# Patient Record
Sex: MALE | Race: OTHER
[De-identification: names, ages, dates, MRNs, and addresses within clinical notes are randomized per-mention and may not be internally consistent; named-entity substitution may affect disease eponyms.]

---

## 2018-03-23 ENCOUNTER — HOSPITAL ENCOUNTER (INPATIENT)
Dept: HOSPITAL 47 - EC | Age: 43
LOS: 1 days | Discharge: ANOTHER HOSPITAL | DRG: 54 | End: 2018-03-24
Payer: COMMERCIAL

## 2018-03-23 VITALS — BODY MASS INDEX: 29.2 KG/M2

## 2018-03-23 DIAGNOSIS — G93.6: ICD-10-CM

## 2018-03-23 DIAGNOSIS — C71.9: Primary | ICD-10-CM

## 2018-03-23 DIAGNOSIS — E87.2: ICD-10-CM

## 2018-03-23 DIAGNOSIS — G40.909: ICD-10-CM

## 2018-03-23 DIAGNOSIS — E87.0: ICD-10-CM

## 2018-03-23 DIAGNOSIS — Z87.820: ICD-10-CM

## 2018-03-23 DIAGNOSIS — E83.52: ICD-10-CM

## 2018-03-23 DIAGNOSIS — G93.89: ICD-10-CM

## 2018-03-23 DIAGNOSIS — Z79.899: ICD-10-CM

## 2018-03-23 DIAGNOSIS — Z80.6: ICD-10-CM

## 2018-03-23 DIAGNOSIS — I49.3: ICD-10-CM

## 2018-03-23 DIAGNOSIS — Z80.8: ICD-10-CM

## 2018-03-23 DIAGNOSIS — F17.200: ICD-10-CM

## 2018-03-23 DIAGNOSIS — Z91.018: ICD-10-CM

## 2018-03-23 LAB
ALBUMIN SERPL-MCNC: 5.1 G/DL (ref 3.5–5)
ALP SERPL-CCNC: 75 U/L (ref 38–126)
ALT SERPL-CCNC: 53 U/L (ref 21–72)
ANION GAP SERPL CALC-SCNC: 16 MMOL/L
ANION GAP SERPL CALC-SCNC: 37 MMOL/L
APTT BLD: 23.1 SEC (ref 22–30)
AST SERPL-CCNC: 41 U/L (ref 17–59)
BUN SERPL-SCNC: 7 MG/DL (ref 9–20)
BUN SERPL-SCNC: 9 MG/DL (ref 9–20)
CALCIUM SPEC-MCNC: 10.4 MG/DL (ref 8.4–10.2)
CALCIUM SPEC-MCNC: 9.1 MG/DL (ref 8.4–10.2)
CELLS COUNTED: 100
CHLORIDE SERPL-SCNC: 104 MMOL/L (ref 98–107)
CHLORIDE SERPL-SCNC: 110 MMOL/L (ref 98–107)
CK SERPL-CCNC: 73 U/L (ref 55–170)
CO2 BLDA-SCNC: 25 MMOL/L (ref 19–24)
CO2 SERPL-SCNC: 24 MMOL/L (ref 22–30)
CO2 SERPL-SCNC: 9 MMOL/L (ref 22–30)
ERYTHROCYTE [DISTWIDTH] IN BLOOD BY AUTOMATED COUNT: 6.37 M/UL (ref 4.3–5.9)
ERYTHROCYTE [DISTWIDTH] IN BLOOD: 17.4 % (ref 11.5–15.5)
GLUCOSE BLD-MCNC: 112 MG/DL (ref 75–99)
GLUCOSE BLD-MCNC: 126 MG/DL (ref 75–99)
GLUCOSE SERPL-MCNC: 124 MG/DL (ref 74–99)
GLUCOSE SERPL-MCNC: 133 MG/DL (ref 74–99)
HCO3 BLDA-SCNC: 24 MMOL/L (ref 21–25)
HCT VFR BLD AUTO: 50.9 % (ref 39–53)
HGB BLD-MCNC: 15 GM/DL (ref 13–17.5)
INR PPP: 0.9 (ref ?–1.2)
LYMPHOCYTES # BLD MANUAL: 5.92 K/UL (ref 1–4.8)
MAGNESIUM SPEC-SCNC: 2.4 MG/DL (ref 1.6–2.3)
MCH RBC QN AUTO: 23.5 PG (ref 25–35)
MCHC RBC AUTO-ENTMCNC: 29.4 G/DL (ref 31–37)
MCV RBC AUTO: 79.9 FL (ref 80–100)
MONOCYTES # BLD MANUAL: 0.92 K/UL (ref 0–1)
NEUTROPHILS NFR BLD MANUAL: 32 %
NEUTS SEG # BLD MANUAL: 3.3 K/UL (ref 1.3–7.7)
PCO2 BLDA: 52 MMHG (ref 35–45)
PH BLDA: 7.26 [PH] (ref 7.35–7.45)
PH UR: 7 [PH] (ref 5–8)
PLATELET # BLD AUTO: 129 K/UL (ref 150–450)
PO2 BLDA: 107 MMHG (ref 83–108)
POTASSIUM SERPL-SCNC: 4.3 MMOL/L (ref 3.5–5.1)
POTASSIUM SERPL-SCNC: 4.9 MMOL/L (ref 3.5–5.1)
PROT SERPL-MCNC: 8.5 G/DL (ref 6.3–8.2)
PT BLD: 9.5 SEC (ref 9–12)
SODIUM SERPL-SCNC: 144 MMOL/L (ref 137–145)
SODIUM SERPL-SCNC: 156 MMOL/L (ref 137–145)
SP GR UR: 1.01 (ref 1–1.03)
TROPONIN I SERPL-MCNC: <0.012 NG/ML (ref 0–0.03)
UROBILINOGEN UR QL STRIP: <2 MG/DL (ref ?–2)
WBC # BLD AUTO: 10.2 K/UL (ref 3.8–10.6)

## 2018-03-23 PROCEDURE — 36600 WITHDRAWAL OF ARTERIAL BLOOD: CPT

## 2018-03-23 PROCEDURE — 82553 CREATINE MB FRACTION: CPT

## 2018-03-23 PROCEDURE — 36415 COLL VENOUS BLD VENIPUNCTURE: CPT

## 2018-03-23 PROCEDURE — 93005 ELECTROCARDIOGRAM TRACING: CPT

## 2018-03-23 PROCEDURE — 81003 URINALYSIS AUTO W/O SCOPE: CPT

## 2018-03-23 PROCEDURE — 99291 CRITICAL CARE FIRST HOUR: CPT

## 2018-03-23 PROCEDURE — 84100 ASSAY OF PHOSPHORUS: CPT

## 2018-03-23 PROCEDURE — 80048 BASIC METABOLIC PNL TOTAL CA: CPT

## 2018-03-23 PROCEDURE — 85610 PROTHROMBIN TIME: CPT

## 2018-03-23 PROCEDURE — 83735 ASSAY OF MAGNESIUM: CPT

## 2018-03-23 PROCEDURE — 83605 ASSAY OF LACTIC ACID: CPT

## 2018-03-23 PROCEDURE — 85730 THROMBOPLASTIN TIME PARTIAL: CPT

## 2018-03-23 PROCEDURE — 96375 TX/PRO/DX INJ NEW DRUG ADDON: CPT

## 2018-03-23 PROCEDURE — 95816 EEG AWAKE AND DROWSY: CPT

## 2018-03-23 PROCEDURE — 87040 BLOOD CULTURE FOR BACTERIA: CPT

## 2018-03-23 PROCEDURE — 71045 X-RAY EXAM CHEST 1 VIEW: CPT

## 2018-03-23 PROCEDURE — 70450 CT HEAD/BRAIN W/O DYE: CPT

## 2018-03-23 PROCEDURE — 82805 BLOOD GASES W/O2 SATURATION: CPT

## 2018-03-23 PROCEDURE — 80053 COMPREHEN METABOLIC PANEL: CPT

## 2018-03-23 PROCEDURE — 82550 ASSAY OF CK (CPK): CPT

## 2018-03-23 PROCEDURE — 84484 ASSAY OF TROPONIN QUANT: CPT

## 2018-03-23 PROCEDURE — 96374 THER/PROPH/DIAG INJ IV PUSH: CPT

## 2018-03-23 PROCEDURE — 85025 COMPLETE CBC W/AUTO DIFF WBC: CPT

## 2018-03-23 RX ADMIN — POTASSIUM CHLORIDE SCH MLS/HR: 14.9 INJECTION, SOLUTION INTRAVENOUS at 20:12

## 2018-03-23 RX ADMIN — LEVETIRACETAM SCH MLS/HR: 100 INJECTION, SOLUTION, CONCENTRATE INTRAVENOUS at 21:57

## 2018-03-23 RX ADMIN — HEPARIN SODIUM SCH UNIT: 5000 INJECTION, SOLUTION INTRAVENOUS; SUBCUTANEOUS at 23:50

## 2018-03-23 NOTE — XR
EXAMINATION TYPE: XR chest 1V portable

 

DATE OF EXAM: 3/23/2018

 

COMPARISON: NONE

 

HISTORY: Chest pain

 

TECHNIQUE: Single frontal view of the chest is obtained.

 

FINDINGS:  

There is no focal air space opacity, pleural effusion, or pneumothorax seen.  

The cardiac silhouette size is within normal limits.   

The osseous structures are intact remote left rib fracture.

 

IMPRESSION:  

1.  No acute process.

## 2018-03-23 NOTE — HP
HISTORY AND PHYSICAL



DATE OF ADMISSION:

03/23/2018



PRESENTING COMPLAINT:

Seizure.



HISTORY OF PRESENTING COMPLAINT:

This is a 42-year-old patient of Dr. Goodman. History is obtained by the nurse at the

bedside.  Patient is rather lethargic.  Patient was in [place] _____summer when he

collapsed.  He was found to have a glioblastoma.  The patient was at [facility], where the tumor was partially resected, and because there was quite a bit of

the base of the brain, the rest of it could not be removed.  The patient was put on a

chemo pill. The patient was coming out from a doctor's appointment with his stepfather

in the car when he had actually had a seizure. They brought him down to the ER.  He had

2 more seizures there for about 30 seconds.  The patient got Ativan x3 and also was

given IV Keppra. Patient had a sodium of 156 and a bicarb of 9. Hence I asked that the

patient be admitted to the ICU with a consultation to Dr. Whitt and Neurology with IV

fluids and bicarb.  No other family member is currently available.  Patient is known to

be a smoker _____ otherwise lives with his mother.



REVIEW OF SYSTEMS:

Patient is rather lethargic.



PAST MEDICAL HISTORY:

1. Glioblastoma with partial surgical removal.

2. Childhood closed head injury.

3. Chronic headaches.

4. Hemianopsia, left eye.



PAST SURGICAL HISTORY:

As above, including hernia repair.



SOCIAL HISTORY:

Patient does not smoke. Alcohol rarely.



FAMILY HISTORY:

Bone cancer and CLL.



HOME MEDICATIONS:

The patient is on a chemo pill and multivitamin.



ALLERGIES:

PISTACHIOS.



PHYSICAL EXAMINATION:

VITAL SIGNS ON PRESENTATION: Temperature 97.9, pulse 128, respiration 16, blood

pressure 136/78, pulse ox 96% on 2 L.

GENERAL APPEARANCE:  Average build. Lying in bed. Lethargic but arousable.

EYES: Pupils equal. Conjunctivae normal.

HEENT: External appearance of nose and ears normal. Oral cavity dry.

NECK: JVD not raised.  Mass not palpable.

RESPIRATORY: Effort normal. Lungs are clear.

CARDIOVASCULAR: First and second sounds normal. No edema.

ABDOMEN: Soft, non-tender. Liver and spleen not palpable.

LYMPHATIC: No lymph node palpable in neck or axillae.

PSYCHIATRY: Patient is arousable but not really able to answer questions and dozes off.

NEUROLOGICAL:  Pupils equal. No facial asymmetry.  Moving all 4 limbs.  Plantars are

downgoing.  Reflexes are equal and symmetrical.



INVESTIGATIONS:

White count 10.2, hemoglobin 15, platelets 129.  Blood gas showed a pH of 7.26 with a

pCO2 of 52. Sodium 156, potassium 4.3, BUN 7, creatinine 0.89. Bicarb is 9, lactic acid

5.5, calcium 10.4, magnesium 2.4.



CT scan of the brain shows abnormal attenuation within the right temporal and parietal

lobe; could be some vasogenic edema; some encephalomalacia involving the posterior

parietal and parietal lobe.



ASSESSMENT:

1. Recurrent seizures in a patient with recent partial glioblastoma that was removed.

    Patient has been started on IV Keppra.  He is currently in a postictal state.

2. 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

3. Glioblastoma, partially removed, currently getting chemotherapy and due for

    radiation down the road.

4. Severe hypernatremia, probably from free water deficit.

5. Significant metabolic acidosis; could be from decreased oral intake.

6. Hypercalcemia, probably from free water deficit.



PLAN:

Patient is put on IV fluids, getting bicarb, also on IV Keppra. Neuro checks are in

place.  Consultations with Dr. Whitt from Critical Care, Dr. Alcala from Nephrology and

Dr. Low from Neurology have been requested. Prognosis is guarded.  Patient is

otherwise n.p.o.  EEG was also ordered.





AMBROSE / ARRON: [id number] / Job#: [id number]

## 2018-03-23 NOTE — CT
EXAMINATION TYPE: CT brain wo con

 

DATE OF EXAM: 3/23/2018

 

COMPARISON: NONE

 

HISTORY: Seizure, history of glioblastoma with surgery

 

CT DLP: 985.3 mGycm.  Automated Exposure Control for Dose Reduction was Utilized.

 

 

TECHNIQUE: CT scan of the head is performed without contrast.

 

FINDINGS:   There is abnormal attenuation within the right temporal lobe suggestive of vasogenic kobi
a. Area of encephalomalacia involving the right occipital lobe and posterior parietal lobe noted and 
there is evidence of previous surgery.

 

No midline shift or evidence of acute hemorrhage. Postsurgical change involving the calvarium noted. 
Changes of chronic sinusitis noted.

 

IMPRESSION:

1. Abnormal attenuation within the right temporal and parietal lobe may be in the basis of vasogenic 
edema given the patient's history and related to the reported history of neoplasm. Recommend MRI.

2. No midline shift or acute hemorrhage.

3. Encephalomalacia involving the posterior occipital and parietal lobe.

## 2018-03-23 NOTE — ED
General Adult HPI





- General


Stated complaint: Seizure


Time Seen by Provider: 03/23/18 12:40


Source: RN notes reviewed





- History of Present Illness


Initial comments: 





This is a 42-year-old male with a past medical history significant for 

glioblastoma.  According to the father the patient had surgery on that in 

October.  Patient was just getting back from a doctor's appointment when he was 

in the car with his father and had a seizure.  Father states she's never had a 

seizure before.  Father states he was feeling fine enough had no recent fevers 

chills or cough per patient has a difficult to breathing.  Patient has not been 

complaining of any chest pain or abdominal pain.  Patient did not fall because 

he was seated in a car so there was no injury.  Patient is postictal and unable 

to give any history.





- Related Data


 Home Medications











 Medication  Instructions  Recorded  Confirmed


 


Multivitamins, Thera [Multivitamin 1 tab PO DAILY 03/23/18 03/23/18





(formulary)]   











 Allergies











Allergy/AdvReac Type Severity Reaction Status Date / Time


 


pistachio nut Allergy  Unknown Verified 03/23/18 13:36














Review of Systems


ROS Statement: 


Those systems with pertinent positive or pertinent negative responses have been 

documented in the HPI.





ROS Other: All systems not noted in ROS Statement are negative.





General Exam





- General Exam Comments


Initial Comments: 





GENERAL:


Patient is well-developed and well-nourished.





ENT:


Neck is soft and supple.  No significant lymphadenopathy is noted.  Oropharynx 

is clear.  Moist mucous membranes.  





EYES:


The sclera were anicteric and conjunctiva were pink and moist.  Extraocular 

movements were intact and pupils were equal round and reactive to light.  

Eyelids were unremarkable.





PULMONARY:


Unlabored respirations.  Good breath sounds bilaterally.  No audible rales 

rhonchi or wheezing was noted.





CARDIOVASCULAR:


There is a regular rate and rhythm without any murmurs gallops or rubs. 





ABDOMEN:


Soft and nontender with normal bowel sounds.  





SKIN:


Skin is clear with no lesions or rashes and otherwise unremarkable.





NEUROLOGIC:


Patient is postictal.





MUSCULOSKELETAL:


Patient will not follow commands at this time. 





LYMPHATICS:


No significant lymphadenopathy is noted





PSYCHIATRIC:


Unable to assess





Course


 Vital Signs











  03/23/18 03/23/18 03/23/18





  13:00 13:49 14:47


 


Temperature   97.9 F


 


Pulse Rate 98 152 H 64


 


Respiratory 20 16 16





Rate   


 


Blood Pressure 160/98 159/84 174/86


 


O2 Sat by Pulse 96 92 L 98





Oximetry   














  03/23/18 03/23/18





  14:58 15:28


 


Temperature  


 


Pulse Rate 150 H 140 H


 


Respiratory 18 16





Rate  


 


Blood Pressure 162/96 142/98


 


O2 Sat by Pulse 94 L 94 L





Oximetry  














Medical Decision Making





- Medical Decision Making





EKG shows sinus tachycardia with occasional PVCs at a rate of 143 bpm MS 

interval is 122 QRS is 96 Q-T intervals 276 QTC is 425 per patient's EKG shows 

no ST segment elevation or with there is some ST segment depression inferiorly.





I spoke with Dr. Whitt and he agreed to admit the patient ICU.  Dr. Whitt  

wanted the patient on D5 W.  I spoke with Dr. Wheeler he agreed to admit the 

patient.  I spoke with Dr. Cruz he agreed to accept the patient as a consult 

he wanted the patient on Keppra so started Keppra.





- Lab Data


Result diagrams: 


 03/23/18 12:40





 03/23/18 12:40


 Lab Results











  03/23/18 03/23/18 03/23/18 Range/Units





  12:40 12:40 12:40 


 


WBC   10.2   (3.8-10.6)  k/uL


 


RBC   6.37 H   (4.30-5.90)  m/uL


 


Hgb   15.0   (13.0-17.5)  gm/dL


 


Hct   50.9   (39.0-53.0)  %


 


MCV   79.9 L   (80.0-100.0)  fL


 


MCH   23.5 L   (25.0-35.0)  pg


 


MCHC   29.4 L   (31.0-37.0)  g/dL


 


RDW   17.4 H   (11.5-15.5)  %


 


Plt Count   129 L   (150-450)  k/uL


 


Neutrophils % (Manual)   32   %


 


Band Neutrophils %   1   %


 


Lymphocytes % (Manual)   58   %


 


Monocytes % (Manual)   9   %


 


Neutrophils # (Manual)   3.30   (1.3-7.7)  k/uL


 


Lymphocytes # (Manual)   5.92 H   (1.0-4.8)  k/uL


 


Monocytes # (Manual)   0.92   (0-1.0)  k/uL


 


Nucleated RBCs   0   (0-0)  /100 WBC


 


Manual Slide Review   Performed   


 


Hypochromasia   Marked   


 


Poikilocytosis (manual   Present   


 


Anisocytosis   Slight   


 


PT     (9.0-12.0)  sec


 


INR     (<1.2)  


 


APTT     (22.0-30.0)  sec


 


Sodium    156 H  (137-145)  mmol/L


 


Potassium    4.3  (3.5-5.1)  mmol/L


 


Chloride    110 H  ()  mmol/L


 


Carbon Dioxide    9 L*  (22-30)  mmol/L


 


Anion Gap    37  mmol/L


 


BUN    7 L  (9-20)  mg/dL


 


Creatinine    0.89  (0.66-1.25)  mg/dL


 


Est GFR (CKD-EPI)AfAm    >90  (>60 ml/min/1.73 sqM)  


 


Est GFR (CKD-EPI)NonAf    >90  (>60 ml/min/1.73 sqM)  


 


Glucose    133 H  (74-99)  mg/dL


 


POC Glucose (mg/dL)     (75-99)  mg/dL


 


POC Glu Operater ID     


 


Plasma Lactic Acid Prateek     (0.7-2.0)  mmol/L


 


Calcium    10.4 H  (8.4-10.2)  mg/dL


 


Magnesium    2.4 H  (1.6-2.3)  mg/dL


 


Total Bilirubin    0.5  (0.2-1.3)  mg/dL


 


AST    41  (17-59)  U/L


 


ALT    53  (21-72)  U/L


 


Alkaline Phosphatase    75  ()  U/L


 


Total Creatine Kinase  73    ()  U/L


 


CK-MB (CK-2)  0.3    (0.0-2.4)  ng/mL


 


CK-MB (CK-2) Rel Index  0.4    


 


Troponin I  <0.012    (0.000-0.034)  ng/mL


 


Total Protein    8.5 H  (6.3-8.2)  g/dL


 


Albumin    5.1 H  (3.5-5.0)  g/dL














  03/23/18 03/23/18 03/23/18 Range/Units





  12:40 12:44 14:45 


 


WBC     (3.8-10.6)  k/uL


 


RBC     (4.30-5.90)  m/uL


 


Hgb     (13.0-17.5)  gm/dL


 


Hct     (39.0-53.0)  %


 


MCV     (80.0-100.0)  fL


 


MCH     (25.0-35.0)  pg


 


MCHC     (31.0-37.0)  g/dL


 


RDW     (11.5-15.5)  %


 


Plt Count     (150-450)  k/uL


 


Neutrophils % (Manual)     %


 


Band Neutrophils %     %


 


Lymphocytes % (Manual)     %


 


Monocytes % (Manual)     %


 


Neutrophils # (Manual)     (1.3-7.7)  k/uL


 


Lymphocytes # (Manual)     (1.0-4.8)  k/uL


 


Monocytes # (Manual)     (0-1.0)  k/uL


 


Nucleated RBCs     (0-0)  /100 WBC


 


Manual Slide Review     


 


Hypochromasia     


 


Poikilocytosis (manual     


 


Anisocytosis     


 


PT  9.5    (9.0-12.0)  sec


 


INR  0.9    (<1.2)  


 


APTT  23.1    (22.0-30.0)  sec


 


Sodium     (137-145)  mmol/L


 


Potassium     (3.5-5.1)  mmol/L


 


Chloride     ()  mmol/L


 


Carbon Dioxide     (22-30)  mmol/L


 


Anion Gap     mmol/L


 


BUN     (9-20)  mg/dL


 


Creatinine     (0.66-1.25)  mg/dL


 


Est GFR (CKD-EPI)AfAm     (>60 ml/min/1.73 sqM)  


 


Est GFR (CKD-EPI)NonAf     (>60 ml/min/1.73 sqM)  


 


Glucose     (74-99)  mg/dL


 


POC Glucose (mg/dL)   126 H   (75-99)  mg/dL


 


POC Glu Operater ID   Shawnee Ibrahim   


 


Plasma Lactic Acid Prateek    5.5 H*  (0.7-2.0)  mmol/L


 


Calcium     (8.4-10.2)  mg/dL


 


Magnesium     (1.6-2.3)  mg/dL


 


Total Bilirubin     (0.2-1.3)  mg/dL


 


AST     (17-59)  U/L


 


ALT     (21-72)  U/L


 


Alkaline Phosphatase     ()  U/L


 


Total Creatine Kinase     ()  U/L


 


CK-MB (CK-2)     (0.0-2.4)  ng/mL


 


CK-MB (CK-2) Rel Index     


 


Troponin I     (0.000-0.034)  ng/mL


 


Total Protein     (6.3-8.2)  g/dL


 


Albumin     (3.5-5.0)  g/dL














Critical Care Time


Critical Care Time: Yes


Total Critical Care Time: 35





Disposition


Clinical Impression: 


 New onset seizure, Hypernatremia, Lactic acidosis





Disposition: ADMITTED AS IP TO THIS HOSP


Referrals: 


None,Stated [REFERRING] - 1-2 days


Time of Disposition: 16:02

## 2018-03-24 VITALS — DIASTOLIC BLOOD PRESSURE: 69 MMHG | SYSTOLIC BLOOD PRESSURE: 103 MMHG | HEART RATE: 72 BPM | RESPIRATION RATE: 17 BRPM

## 2018-03-24 VITALS — TEMPERATURE: 98.7 F

## 2018-03-24 LAB
ANION GAP SERPL CALC-SCNC: 10 MMOL/L
BASOPHILS # BLD AUTO: 0 K/UL (ref 0–0.2)
BASOPHILS NFR BLD AUTO: 0 %
BUN SERPL-SCNC: 9 MG/DL (ref 9–20)
CALCIUM SPEC-MCNC: 8.7 MG/DL (ref 8.4–10.2)
CHLORIDE SERPL-SCNC: 99 MMOL/L (ref 98–107)
CO2 SERPL-SCNC: 31 MMOL/L (ref 22–30)
EOSINOPHIL # BLD AUTO: 0 K/UL (ref 0–0.7)
EOSINOPHIL NFR BLD AUTO: 0 %
ERYTHROCYTE [DISTWIDTH] IN BLOOD BY AUTOMATED COUNT: 4.91 M/UL (ref 4.3–5.9)
ERYTHROCYTE [DISTWIDTH] IN BLOOD: 18.3 % (ref 11.5–15.5)
GLUCOSE BLD-MCNC: 129 MG/DL (ref 75–99)
GLUCOSE BLD-MCNC: 89 MG/DL (ref 75–99)
GLUCOSE BLD-MCNC: 94 MG/DL (ref 75–99)
GLUCOSE SERPL-MCNC: 106 MG/DL (ref 74–99)
HCT VFR BLD AUTO: 35.9 % (ref 39–53)
HGB BLD-MCNC: 11.2 GM/DL (ref 13–17.5)
LYMPHOCYTES # SPEC AUTO: 0.7 K/UL (ref 1–4.8)
LYMPHOCYTES NFR SPEC AUTO: 12 %
MAGNESIUM SPEC-SCNC: 2 MG/DL (ref 1.6–2.3)
MCH RBC QN AUTO: 22.8 PG (ref 25–35)
MCHC RBC AUTO-ENTMCNC: 31.2 G/DL (ref 31–37)
MCV RBC AUTO: 73.1 FL (ref 80–100)
MONOCYTES # BLD AUTO: 0.4 K/UL (ref 0–1)
MONOCYTES NFR BLD AUTO: 6 %
NEUTROPHILS # BLD AUTO: 4.7 K/UL (ref 1.3–7.7)
NEUTROPHILS NFR BLD AUTO: 80 %
PLATELET # BLD AUTO: 93 K/UL (ref 150–450)
POTASSIUM SERPL-SCNC: 3.7 MMOL/L (ref 3.5–5.1)
SODIUM SERPL-SCNC: 140 MMOL/L (ref 137–145)
WBC # BLD AUTO: 5.9 K/UL (ref 3.8–10.6)

## 2018-03-24 RX ADMIN — POTASSIUM CHLORIDE SCH MLS/HR: 7.46 INJECTION, SOLUTION INTRAVENOUS at 06:59

## 2018-03-24 RX ADMIN — LEVETIRACETAM SCH MLS/HR: 100 INJECTION, SOLUTION, CONCENTRATE INTRAVENOUS at 08:44

## 2018-03-24 RX ADMIN — HEPARIN SODIUM SCH: 5000 INJECTION, SOLUTION INTRAVENOUS; SUBCUTANEOUS at 10:32

## 2018-03-24 RX ADMIN — POTASSIUM CHLORIDE SCH MLS/HR: 14.9 INJECTION, SOLUTION INTRAVENOUS at 03:43

## 2018-03-24 RX ADMIN — POTASSIUM CHLORIDE SCH MLS/HR: 7.46 INJECTION, SOLUTION INTRAVENOUS at 05:57

## 2018-03-24 RX ADMIN — POTASSIUM CHLORIDE SCH: 14.9 INJECTION, SOLUTION INTRAVENOUS at 13:25

## 2018-03-24 NOTE — DS
DISCHARGE SUMMARY



DATE OF ADMISSION:

03/23/2018



DATE OF TRANSFER:

03/24/2017



FINAL DIAGNOSIS:

1. Recurrent seizures, grand mal in a patient with recent partial glioblastoma.

2. Encephalomalacia due to recent brain surgery.

3. Glioblastoma with partial removal, getting chemotherapy.

4. Severe hypernatremia, likely from free water deficit, present on admission, now

    corrected.

5. Significant metabolic acidosis upon presentation, improved.

6. Hypercalcemia, probably from free water deficit.



CONSULTATION:

1. Dr. Low from Neurology.

2. Dr. Jung from Nephrology.

3. Dr. Whitt from Pulmonary.



HOSPITAL COURSE:

This is a 42-year-old patient of Dr. Loving.  He used to live in [place], [facility]

last year, found to have a glioblastoma.  Patient did undergo a partial tumor

resection, more cannot be removed because of the location. Started on chemotherapy.

The initial surgery was done by Dr. Shelton at [facility] then followed at the

[facility].  Patient presented with at least 3 rounds of seizures, put on

IV Keppra.  Sodium was 156 and severely acidotic with a bicarb at 9.  Patient's bicarb

improved.  Sodium did come down.  Patient is awake this morning, actually talking.  Did

talk to patient's mother and father at the bedside.  Dr. Whitt wanted the patient to be

transferred to [facility].  Did speak to Dr. Coronado from [facility] who did accept the

patient.  Patient's neurosurgeon there is Dr. Shelton.



On examination, patient able to answer questions.  Lungs are clear.  A bit tired-

appearing.



TODAY'S LABS:

White count 5.9, hemoglobin 11.2, potassium 3.7, bicarb 31, BUN 9, creatinine 0.60.



DISPOSITION:

[facility], accepting physician Dr. Coronado and Dr. Shelton from Neurosurgery.



Discharge planning and time more than 35 minutes.



Prognosis guarded.





MMODL / IJN: [id number] / Job#: [id number]

## 2018-03-24 NOTE — P.NPCON
History of Present Illness





- Reason for Consult


hypernatremia





- History of Present Illness





Reason for consultation: Hypernatremia





History of present illness:


Patient is a 41-year-old male seen in renal consultation for hypernatremia.  

Patient's sodium level was 156 on admission and he was subsequently started on 

D5W at 175 an hour which was subsequently changed to D5W with an amp of sodium 

bicarbonate at 175 mL an hour.  Sodium level this morning is 140.  Sodium 

bicarbonate drip has been discontinued.  Patient has history of glioblastoma 

and had a surgical resection in October 2017.  He was also on chemotherapy 

which she states he completed last month.  Patient had a seizure in the car and 

was subsequently brought to the hospital.  He had 2 more seizures in the ER.  

He has no history of prior seizures.  Denies any history of kidney failure.  

Creatinine is 0.6 today.  His bicarb level was 9 on admission and is up to 31 

today.  His lactic acid level was also 5.5 and is down to 2.2.  Hemodynamically 

stable.  He is currently awake and alert.  He is resting in bed.  No active 

complaints at this time.  Brain CT on admission revealed attenuation in right 

temporal and parietal lobe likely on the basis of vasogenic edema due to his 

history of neoplasm.  No acute changes were noted.





Vital signs are stable.


General: The patient appeared well nourished and normally developed. 


HEENT: Head exam is unremarkable. Neck is without jugular venous distension.


LUNGS: Lungs are clear to auscultation and percussion. Breath sounds decreased.


HEART: Rate and Rhythm are regular. First and second heart sounds normal. No 

murmurs, rubs or gallops. 


ABDOMEN: Abdominal exam reveals normal bowel sounds. Non-tender and non-

distended. No evidence of peritonitis.


EXTREMITITES: No clubbing, cyanosis, or edema.





Past Medical History


Additional Past Medical History / Comment(s): glioblastoma, hemianopsia left eye

, neoplastic malignant fatigue, childhood closed head injury, chronic HA.


History of Any Multi-Drug Resistant Organisms: None Reported


Past Surgical History: Hernia Repair


Additional Past Surgical History / Comment(s): Brain surgery-part of 

glioblastoma removed (10/2017).


Past Anesthesia/Blood Transfusion Reactions: No Reported Reaction


Smoking Status: Never smoker





- Past Family History


  ** grandparent


Family Medical History: Cancer


Additional Family Medical History / Comment(s): bone cancer, CLL





Medications and Allergies


 Home Medications











 Medication  Instructions  Recorded  Confirmed  Type


 


Multivitamins, Thera [Multivitamin 1 tab PO DAILY 03/23/18 03/23/18 History





(formulary)]    











 Allergies











Allergy/AdvReac Type Severity Reaction Status Date / Time


 


pistachio nut Allergy  Unknown Verified 03/23/18 13:36














Physical Exam


Vitals: 


 Vital Signs











  Temp Pulse Pulse Resp BP BP Pulse Ox


 


 03/24/18 07:38        99


 


 03/24/18 07:00   82   23  108/77   98


 


 03/24/18 06:30   82   19  111/78   99


 


 03/24/18 06:00   86   18  111/78   96


 


 03/24/18 05:30   81   15  110/72   98


 


 03/24/18 05:00   82   21  110/72   99


 


 03/24/18 04:30   79   21  106/71   99


 


 03/24/18 04:00   79   19  110/72   98


 


 03/24/18 03:30   79   19  109/74   99


 


 03/24/18 03:00   77   18  107/68   100


 


 03/24/18 02:30   89   21  118/69   98


 


 03/24/18 02:00   91   18  107/72   99


 


 03/24/18 01:30   90   12  108/75   98


 


 03/24/18 01:00   89   17  112/68   100


 


 03/24/18 00:30   82   14  108/76   99


 


 03/24/18 00:00   96   19  104/70   99


 


 03/23/18 23:30   103 H   20  114/74   98


 


 03/23/18 23:00   129 H   20  133/89   98


 


 03/23/18 22:30   103 H   18  114/77   98


 


 03/23/18 22:05   97   16  108/72   99


 


 03/23/18 22:00   97   20  108/72   99


 


 03/23/18 21:30   105 H   21  114/75   99


 


 03/23/18 21:04        98


 


 03/23/18 21:00   100   21  110/76   98


 


 03/23/18 20:30   108 H   19  114/80   99


 


 03/23/18 20:00  98.5 F  97   19  112/77   99


 


 03/23/18 19:30   101 H   16  111/76   99


 


 03/23/18 19:00   103 H   18  113/81   100


 


 03/23/18 18:00   108 H   15  119/85   98


 


 03/23/18 17:50   106 H   20  119/86   97


 


 03/23/18 17:49  98.4 F   100  22   112/77  100


 


 03/23/18 17:48  98.4 F      


 


 03/23/18 17:10  98.4 F  108 H   14  119/89   100


 


 03/23/18 16:16   124 H   16  138/88   95


 


 03/23/18 15:28   140 H   16  142/98   94 L


 


 03/23/18 14:58   150 H   18  162/96   94 L


 


 03/23/18 14:47  97.9 F  128 H   16  136/78   96


 


 03/23/18 13:49   152 H   16  159/84   92 L


 


 03/23/18 13:00   98   20  160/98   96








 Intake and Output











 03/23/18 03/24/18 03/24/18





 22:59 06:59 14:59


 


Intake Total 875 1500 


 


Output Total  1500 


 


Balance 875 0 


 


Intake:   


 


   1050 


 


    Dextrose 5% in Water 1, 875 1050 





    000 ml @ 175 mls/hr IV .   





    Q5H43M OLLIE Rx#:[id number]   


 


  Intake, IV Titration  450 





  Amount   


 


    Dextrose 5% in Water 1,  350 





    000 ml @ 175 mls/hr IV .   





    Q6H OLLIE with Sodium   





    Bicarb (1 Meq/ml) 50 ml   





    Rx#:[id number]   


 


    Potassium Chloride 10 meq  100 





    In Water For Injection 1   





    100ml.bag @ 100 mls/hr   





    IVPB Q1H OLLIE Rx#:   





    [id number]   


 


Output:   


 


  Urine  1500 


 


Other:   


 


  Voiding Method Urinal Urinal 


 


  # Voids 0 1 


 


  Weight 84.6 kg 83.2 kg 














Results





- Lab Results


 Most recent lab results











ABG pH  7.26  (7.35-7.45)  L  03/23/18  15:55    


 


ABG pCO2  52 mmHg (35-45)  H  03/23/18  15:55    


 


ABG pO2  107 mmHg ()   03/23/18  15:55    


 


ABG HCO3  24 mmol/L (21-25)   03/23/18  15:55    


 


ABG O2 Saturation  97.0 % (94-97)   03/23/18  15:55    


 


Calcium  8.7 mg/dL (8.4-10.2)   03/24/18  04:10    


 


Phosphorus  4.2 mg/dL (2.5-4.5)   03/24/18  04:10    


 


Magnesium  2.0 mg/dL (1.6-2.3)   03/24/18  04:10    














 03/24/18 04:10





 03/24/18 04:10





Assessment and Plan


Plan: 





Assessment:


#1.  Hypernatremia related to free water deficit.  Sodium level was 156 on 

admission and improved with free water replacement.  Sodium level this morning 

is 140.


#2.  History of glioblastoma status post surgical resection in October 2017.  

Patient follows at [facility].


#3.  Metabolic acidosis secondary to lactic acidosis secondary to seizures.  


#4.  New onset seizures.





Plan:


Discontinue hypotonic fluids.


Patient to be started on D5 LR at 75 mL an hour.


Repeat sodium level at 5 PM today.


Follow-up EEG.  Neurology following.





Thank you for the consultation.  I will continue to follow the patient with you 

during his hospital stay.

## 2018-03-24 NOTE — P.CNPUL
History of Present Illness


Consult date: 03/24/18


Requesting physician: Jerod Wheeler


Reason for consult: other (Critical care management)


Chief complaint: Seizure activity


History of present illness: 





This is a very pleasant 42-year-old gentleman who has a history of 

glioblastoma.  He had surgery at [facility] in October 2017.  He follows 

with a neurologist at the [facility].  He was coming back from a 

doctor's appointment with his father when he had a seizure.  The patient had 

not had any previous history of seizure activity.  Came to the emergency room 

for the same.  There was no injury detected.  The patient was post ictal 

initially.  Computed tomography scan of the brain revealed abnormal attenuation 

within the right temporal and parietal lobe possibly the basis of age so genic 

edema.  There is no midline shift or acute hemorrhage.  There was some 

encephalomalacia involving the posterior occipital and parietal lobe.  MRI was 

recommended.  EEG is pending.  Arterial blood gases revealed a PaO2 of 102, 

pCO2 of 52 and a pH of 7.26.  He is seen today in consultation in the intensive 

care unit.  He is awake and alert in no acute distress.  He was seen and 

evaluated by neurology and had been initiated on Keppra.  No further seizure 

activity.  He denies any pain or discomfort.  He denies any headaches 

currently.  No shortness of breath, cough or congestion.  He is maintaining 

good O2 saturations in the high 90s on 2 L/m per nasal cannula.  He's been 

afebrile.  Hemodynamically stable.  White count 5.9.  Hemoglobin 11.2.  

Platelet count 93,000.  Creatinine 0.60.  His initial bicarbonate level was 9 

currently 31. He remains on D5W with 1 amp of sodium bicarbonate 175 mL per 

hour. 





Review of Systems





14 point review of system was conducted.  All negative other than as mentioned 

in the HPI.





Past Medical History


Additional Past Medical History / Comment(s): glioblastoma, hemianopsia left eye

, neoplastic malignant fatigue, childhood closed head injury, chronic HA.


History of Any Multi-Drug Resistant Organisms: None Reported


Past Surgical History: Hernia Repair


Additional Past Surgical History / Comment(s): Brain surgery-part of 

glioblastoma removed (10/2017).


Past Anesthesia/Blood Transfusion Reactions: No Reported Reaction


Smoking Status: Never smoker





- Past Family History


  ** grandparent


Family Medical History: Cancer


Additional Family Medical History / Comment(s): bone cancer, CLL





Medications and Allergies


 Home Medications











 Medication  Instructions  Recorded  Confirmed  Type


 


Multivitamins, Thera [Multivitamin 1 tab PO DAILY 03/23/18 03/23/18 History





(formulary)]    











 Allergies











Allergy/AdvReac Type Severity Reaction Status Date / Time


 


pistachio nut Allergy  Unknown Verified 03/23/18 13:36














Physical Exam


Vitals: 


 Vital Signs











  Temp Pulse Pulse Resp BP BP Pulse Ox


 


 03/24/18 10:00   75   15  123/73   98


 


 03/24/18 09:00   76   14  106/84   99


 


 03/24/18 08:00  98.7 F  79   13  121/81   100


 


 03/24/18 07:38        99


 


 03/24/18 07:00   82   23  108/77   98


 


 03/24/18 06:30   82   19  111/78   99


 


 03/24/18 06:00   86   18  111/78   96


 


 03/24/18 05:30   81   15  110/72   98


 


 03/24/18 05:00   82   21  110/72   99


 


 03/24/18 04:30   79   21  106/71   99


 


 03/24/18 04:00   79   19  110/72   98


 


 03/24/18 03:30   79   19  109/74   99


 


 03/24/18 03:00   77   18  107/68   100


 


 03/24/18 02:30   89   21  118/69   98


 


 03/24/18 02:00   91   18  107/72   99


 


 03/24/18 01:30   90   12  108/75   98


 


 03/24/18 01:00   89   17  112/68   100


 


 03/24/18 00:30   82   14  108/76   99


 


 03/24/18 00:00   96   19  104/70   99


 


 03/23/18 23:30   103 H   20  114/74   98


 


 03/23/18 23:00   129 H   20  133/89   98


 


 03/23/18 22:30   103 H   18  114/77   98


 


 03/23/18 22:05   97   16  108/72   99


 


 03/23/18 22:00   97   20  108/72   99


 


 03/23/18 21:30   105 H   21  114/75   99


 


 03/23/18 21:04        98


 


 03/23/18 21:00   100   21  110/76   98


 


 03/23/18 20:30   108 H   19  114/80   99


 


 03/23/18 20:00  98.5 F  97   19  112/77   99


 


 03/23/18 19:30   101 H   16  111/76   99


 


 03/23/18 19:00   103 H   18  113/81   100


 


 03/23/18 18:00   108 H   15  119/85   98


 


 03/23/18 17:50   106 H   20  119/86   97


 


 03/23/18 17:49  98.4 F   100  22   112/77  100


 


 03/23/18 17:48  98.4 F      


 


 03/23/18 17:10  98.4 F  108 H   14  119/89   100


 


 03/23/18 16:16   124 H   16  138/88   95


 


 03/23/18 15:28   140 H   16  142/98   94 L


 


 03/23/18 14:58   150 H   18  162/96   94 L


 


 03/23/18 14:47  97.9 F  128 H   16  136/78   96


 


 03/23/18 13:49   152 H   16  159/84   92 L


 


 03/23/18 13:00   98   20  160/98   96








 Intake and Output











 03/23/18 03/24/18 03/24/18





 22:59 06:59 14:59


 


Intake Total 875 1500 525


 


Output Total  1500 600


 


Balance 875 0 -75


 


Intake:   


 


   1050 525


 


    Dextrose 5% in Water 1, 875 1050 525





    000 ml @ 175 mls/hr IV .   





    Q5H43M OLLIE Rx#:[id number]   


 


  Intake, IV Titration  450 





  Amount   


 


    Dextrose 5% in Water 1,  350 





    000 ml @ 175 mls/hr IV .   





    Q6H OLLIE with Sodium   





    Bicarb (1 Meq/ml) 50 ml   





    Rx#:[id number]   


 


    Potassium Chloride 10 meq  100 





    In Water For Injection 1   





    100ml.bag @ 100 mls/hr   





    IVPB Q1H OLLIE Rx#:   





    [id number]   


 


Output:   


 


  Urine  1500 600


 


Other:   


 


  Voiding Method Urinal Urinal 


 


  # Voids 0 1 1


 


  Weight 84.6 kg 83.2 kg 














GENERAL EXAM: Alert, comfortable in no apparent distress.


HEAD: Normocephalic.


EYES: Normal reaction of pupils, equal size.


NOSE: Clear with pink turbinates.


THROAT: No erythema or exudates.


NECK: No masses, no JVD.


CHEST: No chest wall deformity.


LUNGS: Equal air entry with no crackles, wheeze, rhonchi or dullness.


CVS: S1 and S2 normal with no audible murmur, regular rhythm.


ABDOMEN: No hepatosplenomegaly, normal bowel sounds, no guarding or rigidity.


SPINE: No scoliosis or deformity


SKIN: No rashes


CENTRAL NERVOUS SYSTEM: No focal deficits, tone is normal in all 4 extremities.


EXTREMITIES: There is no peripheral edema.  No clubbing, no cyanosis.  

Peripheral pulses are intact.





Results





- Laboratory Findings


CBC and BMP: 


 03/24/18 04:10





 03/24/18 04:10


ABG











ABG pH  7.26  (7.35-7.45)  L  03/23/18  15:55    


 


ABG pCO2  52 mmHg (35-45)  H  03/23/18  15:55    


 


ABG pO2  107 mmHg ()   03/23/18  15:55    


 


ABG O2 Saturation  97.0 % (94-97)   03/23/18  15:55    





PT/INR, D-dimer











PT  9.5 sec (9.0-12.0)   03/23/18  12:40    


 


INR  0.9  (<1.2)   03/23/18  12:40    








Abnormal lab findings: 


 Abnormal Labs











  03/23/18 03/23/18 03/23/18





  12:40 12:40 12:44


 


RBC  6.37 H  


 


Hgb   


 


Hct   


 


MCV  79.9 L  


 


MCH  23.5 L  


 


MCHC  29.4 L  


 


RDW  17.4 H  


 


Plt Count  129 L  


 


Lymphocytes #   


 


Lymphocytes # (Manual)  5.92 H  


 


ABG pH   


 


ABG pCO2   


 


ABG Total CO2   


 


Sodium   156 H 


 


Chloride   110 H 


 


Carbon Dioxide   9 L* 


 


BUN   7 L 


 


Creatinine   


 


Glucose   133 H 


 


POC Glucose (mg/dL)    126 H


 


Plasma Lactic Acid Prtaeek   


 


Calcium   10.4 H 


 


Magnesium   2.4 H 


 


Total Protein   8.5 H 


 


Albumin   5.1 H 














  03/23/18 03/23/18 03/23/18





  14:45 15:55 17:07


 


RBC   


 


Hgb   


 


Hct   


 


MCV   


 


MCH   


 


MCHC   


 


RDW   


 


Plt Count   


 


Lymphocytes #   


 


Lymphocytes # (Manual)   


 


ABG pH   7.26 L 


 


ABG pCO2   52 H 


 


ABG Total CO2   25 H 


 


Sodium   


 


Chloride   


 


Carbon Dioxide   


 


BUN   


 


Creatinine   


 


Glucose   


 


POC Glucose (mg/dL)    112 H


 


Plasma Lactic Acid Prateek  5.5 H*  


 


Calcium   


 


Magnesium   


 


Total Protein   


 


Albumin   














  03/23/18 03/23/18 03/24/18





  18:32 19:41 00:02


 


RBC   


 


Hgb   


 


Hct   


 


MCV   


 


MCH   


 


MCHC   


 


RDW   


 


Plt Count   


 


Lymphocytes #   


 


Lymphocytes # (Manual)   


 


ABG pH   


 


ABG pCO2   


 


ABG Total CO2   


 


Sodium   


 


Chloride   


 


Carbon Dioxide   


 


BUN   


 


Creatinine   


 


Glucose   124 H 


 


POC Glucose (mg/dL)   


 


Plasma Lactic Acid Prateek  3.1 H*   3.3 H*


 


Calcium   


 


Magnesium   


 


Total Protein   


 


Albumin   














  03/24/18 03/24/18 03/24/18





  00:05 04:10 04:10


 


RBC   


 


Hgb   11.2 L D 


 


Hct   35.9 L 


 


MCV   73.1 L D 


 


MCH   22.8 L 


 


MCHC   


 


RDW   18.3 H 


 


Plt Count   93 L 


 


Lymphocytes #   0.7 L 


 


Lymphocytes # (Manual)   


 


ABG pH   


 


ABG pCO2   


 


ABG Total CO2   


 


Sodium   


 


Chloride   


 


Carbon Dioxide    31 H


 


BUN   


 


Creatinine    0.60 L


 


Glucose    106 H


 


POC Glucose (mg/dL)  129 H  


 


Plasma Lactic Acid Prateek   


 


Calcium   


 


Magnesium   


 


Total Protein   


 


Albumin   














  03/24/18





  04:10


 


RBC 


 


Hgb 


 


Hct 


 


MCV 


 


MCH 


 


MCHC 


 


RDW 


 


Plt Count 


 


Lymphocytes # 


 


Lymphocytes # (Manual) 


 


ABG pH 


 


ABG pCO2 


 


ABG Total CO2 


 


Sodium 


 


Chloride 


 


Carbon Dioxide 


 


BUN 


 


Creatinine 


 


Glucose 


 


POC Glucose (mg/dL) 


 


Plasma Lactic Acid Prateek  2.2 H*


 


Calcium 


 


Magnesium 


 


Total Protein 


 


Albumin 














- Diagnostic Findings


Chest x-ray: image reviewed





Assessment and Plan


Assessment: 





Impression:





#1 Acute seizure activity of unclear etiology.  Computed tomography scan of the 

brain reveals abnormal attenuation within the right temporal and parietal lobe 

may be in the basis of vasogenic edema given the patient's history of 

glioblastoma.  There is also noted encephalomalacia involving the posterior 

occipital and parietal lobe.  Initiated on Keppra.





#2 History of glioblastoma, surgical intervention in October 2017.





#3 History of closed head injury as a child with chronic migraines.





Plan:





The patient was seen and evaluated by Dr. Whitt.  We'll discontinue the bicarb 

drip.  Place him on D5W with LR at 75 MLS per hour.  Continue with seizure 

precautions.  Neurology is on the case.  Keppra has been initiated.  EEG is 

pending.  The patient would benefit from transfer to a tertiary care facility.  

The patient is requesting to go to where his neurosurgeon is at, [facility].  We'll have the attending make arrangements.  In the interim we'll 

continue to monitor him here closely in the intensive care unit and make 

further recommendations based on his clinical status.





I, the cosigning physician, performed a history & physical examination of the 

patient. Lungs sounds are clear.  Maintaining good O2 saturations in the 90s on 

2 L/m per nasal cannula.  I discussed the assessment and plan of care with my 

nurse practitioner, Edith Rene. I attest to the above consultation as dictated 

by her.


Time with Patient: Greater than 30
